# Patient Record
Sex: MALE | Race: WHITE | NOT HISPANIC OR LATINO
[De-identification: names, ages, dates, MRNs, and addresses within clinical notes are randomized per-mention and may not be internally consistent; named-entity substitution may affect disease eponyms.]

---

## 2024-08-26 PROBLEM — Z00.00 ENCOUNTER FOR PREVENTIVE HEALTH EXAMINATION: Status: ACTIVE | Noted: 2024-08-26

## 2024-08-30 ENCOUNTER — NON-APPOINTMENT (OUTPATIENT)
Age: 74
End: 2024-08-30

## 2024-09-03 ENCOUNTER — NON-APPOINTMENT (OUTPATIENT)
Age: 74
End: 2024-09-03

## 2024-09-03 ENCOUNTER — TRANSCRIPTION ENCOUNTER (OUTPATIENT)
Age: 74
End: 2024-09-03

## 2024-09-03 ENCOUNTER — APPOINTMENT (OUTPATIENT)
Dept: NEUROSURGERY | Facility: CLINIC | Age: 74
End: 2024-09-03
Payer: MEDICARE

## 2024-09-03 ENCOUNTER — OUTPATIENT (OUTPATIENT)
Dept: OUTPATIENT SERVICES | Facility: HOSPITAL | Age: 74
LOS: 1 days | End: 2024-09-03
Payer: MEDICARE

## 2024-09-03 VITALS
WEIGHT: 180 LBS | HEART RATE: 59 BPM | HEIGHT: 70 IN | SYSTOLIC BLOOD PRESSURE: 108 MMHG | DIASTOLIC BLOOD PRESSURE: 73 MMHG | BODY MASS INDEX: 25.77 KG/M2 | OXYGEN SATURATION: 90 %

## 2024-09-03 DIAGNOSIS — Z82.49 FAMILY HISTORY OF ISCHEMIC HEART DISEASE AND OTHER DISEASES OF THE CIRCULATORY SYSTEM: ICD-10-CM

## 2024-09-03 DIAGNOSIS — I48.91 UNSPECIFIED ATRIAL FIBRILLATION: ICD-10-CM

## 2024-09-03 DIAGNOSIS — E78.00 PURE HYPERCHOLESTEROLEMIA, UNSPECIFIED: ICD-10-CM

## 2024-09-03 DIAGNOSIS — I10 ESSENTIAL (PRIMARY) HYPERTENSION: ICD-10-CM

## 2024-09-03 DIAGNOSIS — G25.0 ESSENTIAL TREMOR: ICD-10-CM

## 2024-09-03 PROCEDURE — 70450 CT HEAD/BRAIN W/O DYE: CPT | Mod: 26,MH

## 2024-09-03 PROCEDURE — 70450 CT HEAD/BRAIN W/O DYE: CPT

## 2024-09-03 PROCEDURE — 99205 OFFICE O/P NEW HI 60 MIN: CPT

## 2024-09-03 RX ORDER — PRAVASTATIN SODIUM 80 MG/1
80 TABLET ORAL
Refills: 0 | Status: ACTIVE | COMMUNITY

## 2024-09-03 RX ORDER — EZETIMIBE 10 MG/1
10 TABLET ORAL
Refills: 0 | Status: ACTIVE | COMMUNITY

## 2024-09-03 RX ORDER — PRIMIDONE 250 MG/1
250 TABLET ORAL
Refills: 0 | Status: ACTIVE | COMMUNITY

## 2024-09-03 RX ORDER — BENAZEPRIL HYDROCHLORIDE 10 MG/1
10 TABLET, FILM COATED ORAL
Refills: 0 | Status: ACTIVE | COMMUNITY

## 2024-09-03 RX ORDER — APIXABAN 5 MG/1
TABLET, FILM COATED ORAL
Refills: 0 | Status: ACTIVE | COMMUNITY

## 2024-09-03 RX ORDER — HYDROCHLOROTHIAZIDE 12.5 MG/1
12.5 CAPSULE ORAL
Refills: 0 | Status: ACTIVE | COMMUNITY

## 2024-09-03 NOTE — ASSESSMENT
[FreeTextEntry1] : 74M with ET x 5-6 years. Postural/action tremor in b/l hands. Tried primidone which he currently takes but can't tolerate at higher doses due to sedation. He would like high intensity focused ultrasound.   Discussed MRI guided focused ultrasound therapy including the procedure, risks, and benefits in office today.   I have discussed this patients symptoms with them. I have discussed how the symptoms of the patients essential tremor are disabling. His bilateral tremor (R>L) significantly interferes with his quality of life. Would target the left VIM thalamus/DRT tract which would improve his right side tremor which is more bothersome to him. It would not improve or affect his left hand tremor. It would not help leg tremor, and would not help head tremor if it were to develop.  During MRI guided focused ultrasound, a lesion is made in brain that is pathologically related to cause of tremor. If pathological track is targeted by creating lesion/burn in area along white matter tract, it will also treat tremor. Ventral intermediate nucleus of the thalamus is area that is targeted during procedure.  High intensity focused ultrasound is a procedure that occurs in an MRI magnet, where a series of MRIs are performed and the target is identified and ultrasound therapy is targeted at identified area. The head is completely shaved prior to a frame being placed. Then a rubber bag of circulating cool water is fitted to the head to prevent thermal injury to the scalp, and improve the interface between the transducer and the scalp. Subthreshold sonications are delivered so benefit and side effect can be assessed prior to creating a permanent lesion. Many series of assessments will be performed during the procedure. Lesions are irreversible. FDA approved for unilateral treatment. If this option chosen, side of the brain correlated with more bothersome symptoms, or dominant hand would be targeted. The other side can be treated 9-12 months later if the patient desires. This is procedure, not a surgery, not performed under anesthesia.  Side effects are possible including tingling around face on one side or around lips or hand. That may persist up to several months after procedure, and can persist. May be faint to severe. May attenuate over a year following procedure. Weakness possible in addition to paraesthesias. Side effect may cyndee over time. Very common to have unsteady gait after procedure. You will be discharged with a walker as a fall precaution. Efficacy of tremor suppression wanes after 1 year and attenuates over time. May need another procedure in the future.  PLAN: -Referral to movement disorder neurologist for evaluation and tremor grading.  -CT head HIFU protocol to be done at [facility] to determine skull density ratio (SDR) -If SDR is favorable for treatment, pre-procedure 3T Siemens Barbara, Research magnet, MRI brain with/without contrast, DBS protocol - Venkat for trajectory and planning  -Cardiac clearance to stop Eliquis 3 days prior to procedure and resume the day after the procedure  I, Dr. Travis Robledo, personally performed the evaluation and management (E/M) services for this new patient.  That E/M includes conducting the clinically appropriate initial history &/or exam, assessing all conditions, and establishing the plan of care.  Today, my ANGEL, Campanja, was here to observe my evaluation and management service for this patient & follow plan of care established by me going forward.

## 2024-09-03 NOTE — REASON FOR VISIT
[New Patient Visit] : a new patient visit [Referred By: _________] : Patient was referred by JOSE [Spouse] : spouse

## 2024-09-03 NOTE — HISTORY OF PRESENT ILLNESS
[de-identified] : Hamzah Monroe is a 74 year old right handed male with PMH of essential tremor, HTN, HLD, a-fib on Eliquis. He presents to the office for neurosurgical consultation for high intensity focused ultrasound. He is under the care of neurologist Dr. Cortes. Tremor started about 5-6 years ago in right hand. It started in left hand soon after. Family notices leg tremor when he is driving. Takes Primidone 250 mg tablet, takes 1.5 tabs daily. He didn't try propranolol because he is napping multiple times per day. He does not think Primidone is helping tremor. Family history of tremor in his brother, and uncle. His tremor is present at rest and with activity but is much worse when eating, carrying coffee. He avoids using a fork, using a spoon is easier. He drinks with 2 hands. He does not drink alcohol. He can write but it is very difficult. Balance is good. Denies falls. Does not use ambulation aids. He walks every other day. Does 9 sets of stairs at [facility]. He goes to the gym about an hour on the other days.   Current tremor medications: Was on Primidone 250 mg 1 tab TID- cut back on this due to daytime drowsiness to Primidone 250 mg tablet, 1.5 tabs  daily.

## 2024-09-03 NOTE — PHYSICAL EXAM
[General Appearance - Alert] : alert [General Appearance - In No Acute Distress] : in no acute distress [Oriented To Time, Place, And Person] : oriented to person, place, and time [Affect] : the affect was normal [Mood] : the mood was normal [Person] : oriented to person [Place] : oriented to place [Time] : oriented to time [Cranial Nerves Oculomotor (III)] : extraocular motion intact [Cranial Nerves Trigeminal (V)] : facial sensation intact symmetrically [Cranial Nerves Facial (VII)] : face symmetrical [Cranial Nerves Vestibulocochlear (VIII)] : hearing was intact bilaterally [Cranial Nerves Accessory (XI - Cranial And Spinal)] : head turning and shoulder shrug symmetric [Cranial Nerves Hypoglossal (XII)] : there was no tongue deviation with protrusion [Motor Strength] : muscle strength was normal in all four extremities [Motor Handedness Right-Handed] : the patient is right hand dominant [Sensation Tactile Decrease] : light touch was intact [] : no respiratory distress [Respiration, Rhythm And Depth] : normal respiratory rhythm and effort [Skin Color & Pigmentation] : normal skin color and pigmentation [FreeTextEntry8] : no rest tremor in arms, present in b/l legs. Postural with arms extended: 1 cm b/l, winged: 1-2 cm b/l, finger to nose: 1-3 cm left, 3 cm right. Tremor exacerbated holding mug and bringing to mouth with right hand.

## 2024-09-09 ENCOUNTER — NON-APPOINTMENT (OUTPATIENT)
Age: 74
End: 2024-09-09

## 2024-09-23 ENCOUNTER — APPOINTMENT (OUTPATIENT)
Dept: NEUROLOGY | Facility: CLINIC | Age: 74
End: 2024-09-23

## 2024-10-10 ENCOUNTER — APPOINTMENT (OUTPATIENT)
Dept: MRI IMAGING | Facility: HOSPITAL | Age: 74
End: 2024-10-10